# Patient Record
Sex: MALE | Race: WHITE | NOT HISPANIC OR LATINO | URBAN - METROPOLITAN AREA
[De-identification: names, ages, dates, MRNs, and addresses within clinical notes are randomized per-mention and may not be internally consistent; named-entity substitution may affect disease eponyms.]

---

## 2023-07-25 ENCOUNTER — APPOINTMENT (OUTPATIENT)
Dept: GENERAL RADIOLOGY | Facility: HOSPITAL | Age: 48
End: 2023-07-25
Payer: COMMERCIAL

## 2023-07-25 ENCOUNTER — HOSPITAL ENCOUNTER (OUTPATIENT)
Facility: HOSPITAL | Age: 48
Discharge: HOME | End: 2023-07-25
Attending: EMERGENCY MEDICINE | Admitting: EMERGENCY MEDICINE
Payer: MEDICARE

## 2023-07-25 VITALS
RESPIRATION RATE: 18 BRPM | SYSTOLIC BLOOD PRESSURE: 174 MMHG | BODY MASS INDEX: 34.71 KG/M2 | HEIGHT: 78 IN | HEART RATE: 89 BPM | DIASTOLIC BLOOD PRESSURE: 109 MMHG | WEIGHT: 300 LBS | OXYGEN SATURATION: 97 % | TEMPERATURE: 99.2 F

## 2023-07-25 DIAGNOSIS — S63.602A SPRAIN OF LEFT THUMB, UNSPECIFIED SITE OF DIGIT, INITIAL ENCOUNTER: Primary | ICD-10-CM

## 2023-07-25 PROCEDURE — G0463 HOSPITAL OUTPT CLINIC VISIT: HCPCS | Performed by: EMERGENCY MEDICINE

## 2023-07-25 PROCEDURE — 73140 X-RAY EXAM OF FINGER(S): CPT

## 2023-07-25 RX ORDER — NAPROXEN 500 MG/1
500 TABLET ORAL 2 TIMES DAILY WITH MEALS
Qty: 14 TABLET | Refills: 0 | Status: SHIPPED | OUTPATIENT
Start: 2023-07-25 | End: 2023-08-01

## 2023-07-25 NOTE — [facility] PROVIDER NOTE
Subjective   History of Present Illness  The patient is a 48-year-old male.  He presents with left thumb pain.  He states a month ago he did injure his left thumb.  He was evaluated at that time with radiographs.  The radiograph did not show any evidence of fracture or dislocation.  He states that yesterday his large dog jumped back onto the left thumb.  He now has pain at the base of the left thumb.  He is right-hand dominant.  He still has range of motion but it is somewhat decreased secondary to pain.  Skin is intact.  No other injuries    Review of Systems  Constitutional: No fevers, chills, sweats unless otherwise documented in HPI  Eyes: No recent visual problems, eye discharge, eye pain, redness unless otherwise documented in HPI  HEENT: No ear pain, nasal congestion, sore throat, voice changes unless otherwise documented in HPI  Respiratory: No shortness of breath, cough, pain on breathing, sputum production unless otherwise documented in HPI  Cardiovascular: No chest pain, palpitations, syncope, orthopnea unless otherwise documented in HPI  Gastrointestinal: No nausea, vomiting, diarrhea, constipation unless otherwise documented in HPI  Genitourinary: No hematuria, dysuria, incontinence unless otherwise documented in HPI  Endocrine: Negative for excessive thirst, excessive hunger, excessive urination, heat or cold intolerance unless otherwise documented in HPI  Musculoskeletal: No back pain, neck pain, joint pain, muscle pain, decreased range of motion unless otherwise documented in HPI  Integumentary: No rash, pruritus, abrasion, lesions unless otherwise documented in HPI  Neurologic: No weakness, numbness, frequent headaches, tremors unless otherwise documented in HPI  Psychiatric: No anxiety, depression, mood changes, hallucinations unless otherwise documented in HPI        Past Medical History:   Diagnosis Date    Colostomy in place     Crohn's disease     Diabetes mellitus     Neuropathy        No  Known Allergies    Past Surgical History:   Procedure Laterality Date    COLOSTOMY      TOE AMPUTATION         History reviewed. No pertinent family history.    Social History     Socioeconomic History    Marital status: Unknown   Tobacco Use    Smoking status: Never   Substance and Sexual Activity    Alcohol use: Not Currently    Drug use: Never           Objective   Physical Exam    General: Awake alert no acute distress    Musculoskeletal: Left hand reveals tenderness to palpation at the base of the left thumb at the MCP area.  Overlying skin is intact with no evidence of cellulitis.  Somewhat decreased range of motion secondary to pain.  The joint itself is stable.  I do not identify any laxity at the MCP joint.  Sensation intact    Procedures           ED Course  ED Course as of 07/25/23 0917   Tue Jul 25, 2023   0[address] Cameron [id number] has been reviewed.  Current morphine equivalent is 30 [TC]   0913 Left thumb radiograph reveals no acute bony abnormality. [TC]   0913 A left wrist splint with thumb spica support was placed.  After application the hand is noted to be neurovascular intact [TC]      ED Course User Index  [TC] Keny Escobar MD                                         Differential diagnosis: Fracture sprain contusion  Medical Decision Making  Problems Addressed:  Sprain of left thumb, unspecified site of digit, initial encounter: complicated acute illness or injury    Amount and/or Complexity of Data Reviewed  Radiology: ordered.    Risk  Prescription drug management.    The x-rays were independently reviewed as well as review of the radiologist interpretation  Upon discharge patient noted to be very stable.  Appropriate treatment plan and return precautions discussed    Final diagnoses:   Sprain of left thumb, unspecified site of digit, initial encounter       ED Disposition  ED Disposition       ED Disposition   Discharge    Condition   Stable    Comment   --               Curt Recinos  MD [facility]  [address] [facility] [address] [phone]    In 1 week           Medication List        New Prescriptions      naproxen 500 MG tablet  Commonly known as: NAPROSYN  Take 1 tablet by mouth 2 (Two) Times a Day With Meals for 7 days.               Where to Get Your Medications        These medications were sent to [id number] - [place], [place] - [address] AT [facility] - [phone]  - [phone]   [address], [facility] [address]      Phone: [phone]   naproxen 500 MG tablet

## 2023-07-25 NOTE — DISCHARGE INSTRUCTIONS
Today the x-ray of your thumb does not reveal any acute fracture or dislocation.  X-rays do not however show soft tissue such as ligaments or muscles.  I would like you to utilize the splint as placed today for the next 7 to 10 days.  I did give you a referral to our on-call orthopedic surgeon for follow-up.  Please call to make appointment    I did send in a prescription for 7-day course of anti-inflammatories.  Take as directed    We are happy to see you for increased pain, redness, any other worsening symptoms    Please read all of the instructions in this handout.  If you receive prescriptions please fill them and take them as directed.  Please call your primary care physician for follow-up appointment in the next 5 to 7 days.  If you do not have a physician you may call the [facility] referral line at [phone].    You may return to the emergency department at any time for any concerns such as worsening symptoms.  If you received a work or school note it will be printed at the back of this packet.